# Patient Record
Sex: MALE | Race: BLACK OR AFRICAN AMERICAN | NOT HISPANIC OR LATINO | URBAN - METROPOLITAN AREA
[De-identification: names, ages, dates, MRNs, and addresses within clinical notes are randomized per-mention and may not be internally consistent; named-entity substitution may affect disease eponyms.]

---

## 2018-06-01 ENCOUNTER — OFFICE VISIT - HEALTHEAST (OUTPATIENT)
Dept: FAMILY MEDICINE | Facility: CLINIC | Age: 23
End: 2018-06-01

## 2018-06-01 DIAGNOSIS — J98.01 ACUTE BRONCHOSPASM: ICD-10-CM

## 2018-06-01 DIAGNOSIS — J30.9 ALLERGIC RHINITIS: ICD-10-CM

## 2018-06-01 DIAGNOSIS — Z20.2 EXPOSURE TO CHLAMYDIA: ICD-10-CM

## 2018-06-01 DIAGNOSIS — Z00.00 ROUTINE GENERAL MEDICAL EXAMINATION AT A HEALTH CARE FACILITY: ICD-10-CM

## 2018-06-01 LAB — LDLC SERPL CALC-MCNC: 98 MG/DL

## 2018-06-01 RX ORDER — AZITHROMYCIN 250 MG/1
TABLET, FILM COATED ORAL
Qty: 4 TABLET | Refills: 0 | Status: SHIPPED | OUTPATIENT
Start: 2018-06-01

## 2018-06-01 RX ORDER — MONTELUKAST SODIUM 10 MG/1
10 TABLET ORAL AT BEDTIME
Qty: 30 TABLET | Refills: 6 | Status: SHIPPED | OUTPATIENT
Start: 2018-06-01

## 2018-06-01 RX ORDER — ALBUTEROL SULFATE 90 UG/1
2 AEROSOL, METERED RESPIRATORY (INHALATION) EVERY 4 HOURS PRN
Qty: 18 G | Refills: 11 | Status: SHIPPED | OUTPATIENT
Start: 2018-06-01

## 2018-06-01 ASSESSMENT — MIFFLIN-ST. JEOR: SCORE: 1611.55

## 2018-06-05 ENCOUNTER — COMMUNICATION - HEALTHEAST (OUTPATIENT)
Dept: FAMILY MEDICINE | Facility: CLINIC | Age: 23
End: 2018-06-05

## 2018-06-05 LAB
C TRACH DNA SPEC QL PROBE+SIG AMP: POSITIVE
N GONORRHOEA DNA SPEC QL NAA+PROBE: NEGATIVE

## 2021-06-01 VITALS — BODY MASS INDEX: 23.39 KG/M2 | HEIGHT: 67 IN | WEIGHT: 149 LBS

## 2021-06-18 NOTE — PROGRESS NOTES
New pt  Female told me to get checked.  She had rash and dx chlamydia and genital herpes.  Pt with rash and itch    Couple days rash after contact    hosp surg neg  Fmh: [facility]  Med neg  nkda    Is supposed to be on singulair and inhaler but off a year.   Has heavy breathing with sports.  If over pushing self. Plays 2-3 hours prior to sxs.      hab neg cig   No etoh  Fhsh: single.  Works pca at [employer]    Allergic rhinitis doing well with medications controlling congestion        ROS:  Constitutional: denies fever  Vision: denies change in vision  ENT: denies cough or congestion  Thyroid: denies unusual fatigue  Resp: above shortness of breath    Denies night shortness of breath  Or pnd    Card: denies palpitations  GI: denies vomiting or abnormal stool, melena, hematochezia  : denies dysuria  Neuro: denies numbness or weakness  Derm: above rash  Joints: denies redness or swelling  Endo: denies polyuria  Mental health: mood is good  Extremities: no edema  Mobility: stable    Otherwise negative review of systems    ROS: as noted above    OBJECTIVE:   Vitals:    06/01/18 0923   BP: 116/84   Pulse: (!) 52   Resp: 16   Temp: 97.6  F (36.4  C)      Wt is noted.  No diaphoresis  Eyes: nl eom, anicteric   External ears, nose: nl    Neck: nl nodes, supple, thyroid normal   Lungs: clear to ausc   Heart: regular rhythm  Abd: soft nontender     No cva (renal) tenderness  Neuro: no weakness  Skin no rash  Joints: uninflamed   No ketotic breath odor noted  Mental: euthymic  Ext: nontender calves   Gait: normal    Bmi:23  Healed few skin lesions on shaft of penis  Hypopigmented.  consistent with healed vesicles    ASSESSMENT/PLAN:   Health Maintenance/ Plan: anticipatory guidance, discussion of risk factors, lifestyle modification, and risk factor management.    Additional diagnoses and related orders:  1. Routine general medical examination at a health care facility  LDL  Cholesterol, Direct   2. Exposure to chlamydia  azithromycin (ZITHROMAX) 250 MG tablet    Chlamydia trachomatis & Neisseria gonorrhoeae, Amplified Detection   3. Bronchospasm  albuterol (PROAIR HFA) 90 mcg/actuation inhaler    montelukast (SINGULAIR) 10 mg tablet   4. Allergic rhinitis  montelukast (SINGULAIR) 10 mg tablet